# Patient Record
Sex: MALE | Race: WHITE
[De-identification: names, ages, dates, MRNs, and addresses within clinical notes are randomized per-mention and may not be internally consistent; named-entity substitution may affect disease eponyms.]

---

## 2018-08-21 ENCOUNTER — HOSPITAL ENCOUNTER (EMERGENCY)
Dept: HOSPITAL 25 - ED | Age: 73
LOS: 1 days | Discharge: HOME | End: 2018-08-22
Payer: COMMERCIAL

## 2018-08-21 ENCOUNTER — HOSPITAL ENCOUNTER (EMERGENCY)
Dept: HOSPITAL 25 - UCEAST | Age: 73
Discharge: ANOTHER HOSPITAL | End: 2018-08-21
Payer: COMMERCIAL

## 2018-08-21 VITALS — DIASTOLIC BLOOD PRESSURE: 87 MMHG | SYSTOLIC BLOOD PRESSURE: 172 MMHG

## 2018-08-21 DIAGNOSIS — R10.32: Primary | ICD-10-CM

## 2018-08-21 DIAGNOSIS — Z79.01: ICD-10-CM

## 2018-08-21 DIAGNOSIS — R11.0: ICD-10-CM

## 2018-08-21 DIAGNOSIS — Z87.19: ICD-10-CM

## 2018-08-21 DIAGNOSIS — Z85.46: ICD-10-CM

## 2018-08-21 DIAGNOSIS — N20.2: Primary | ICD-10-CM

## 2018-08-21 DIAGNOSIS — Z86.711: ICD-10-CM

## 2018-08-21 DIAGNOSIS — K57.30: ICD-10-CM

## 2018-08-21 LAB
BASOPHILS # BLD AUTO: 0.1 10^3/UL (ref 0–0.2)
EOSINOPHIL # BLD AUTO: 0 10^3/UL (ref 0–0.6)
HCT VFR BLD AUTO: 41 % (ref 42–52)
HGB BLD-MCNC: 14 G/DL (ref 14–18)
LYMPHOCYTES # BLD AUTO: 1.6 10^3/UL (ref 1–4.8)
MCH RBC QN AUTO: 31 PG (ref 27–31)
MCHC RBC AUTO-ENTMCNC: 34 G/DL (ref 31–36)
MCV RBC AUTO: 90 FL (ref 80–94)
MONOCYTES # BLD AUTO: 0.7 10^3/UL (ref 0–0.8)
NEUTROPHILS # BLD AUTO: 15.9 10^3/UL (ref 1.5–7.7)
NRBC # BLD AUTO: 0 10^3/UL
NRBC BLD QL AUTO: 0
PLATELET # BLD AUTO: 273 10^3/UL (ref 150–450)
RBC # BLD AUTO: 4.57 10^6/UL (ref 4–5.4)
WBC # BLD AUTO: 18.3 10^3/UL (ref 3.5–10.8)

## 2018-08-21 PROCEDURE — 74176 CT ABD & PELVIS W/O CONTRAST: CPT

## 2018-08-21 PROCEDURE — 85025 COMPLETE CBC W/AUTO DIFF WBC: CPT

## 2018-08-21 PROCEDURE — 83690 ASSAY OF LIPASE: CPT

## 2018-08-21 PROCEDURE — 80053 COMPREHEN METABOLIC PANEL: CPT

## 2018-08-21 PROCEDURE — 99283 EMERGENCY DEPT VISIT LOW MDM: CPT

## 2018-08-21 PROCEDURE — 96374 THER/PROPH/DIAG INJ IV PUSH: CPT

## 2018-08-21 PROCEDURE — 36415 COLL VENOUS BLD VENIPUNCTURE: CPT

## 2018-08-21 PROCEDURE — G0463 HOSPITAL OUTPT CLINIC VISIT: HCPCS

## 2018-08-21 PROCEDURE — 96360 HYDRATION IV INFUSION INIT: CPT

## 2018-08-21 PROCEDURE — 96375 TX/PRO/DX INJ NEW DRUG ADDON: CPT

## 2018-08-21 PROCEDURE — 99203 OFFICE O/P NEW LOW 30 MIN: CPT

## 2018-08-21 NOTE — ED
Abdominal Pain/Male





- HPI Summary


HPI Summary: 


This patient is a 73 year old M BIBA to [facility] from urgent care with a chief 

complaint of L flank pain radiating to back that began at approximately 2030. 

The patient rates the pain 10/10 in severity. Symptoms aggravated by nothing. 

Symptoms alleviated by nothing. Patient reports nausea.








- History of Current Complaint


Chief Complaint: EDFlankPain


Stated Complaint: ABD PAIN-SENT F/CC


Time Seen by Provider: 08/21/18 22:29


Hx Obtained From: Patient


Onset/Duration: Sudden Onset, Lasting Hours, Still Present


Timing: Constant


Severity Initially: Severe


Severity Currently: Severe


Pain Intensity: 10


Pain Scale Used: 0-10 Numeric


Location: Flank


Radiates: Yes


Radiates to: Back


Character: Sharp


Aggravating Factor(s): Nothing


Alleviating Factor(s): Nothing


Associated Signs And Symptoms: Positive: Nausea





- Allergies/Home Medications


Allergies/Adverse Reactions: 


 Allergies











Allergy/AdvReac Type Severity Reaction Status Date / Time


 


No Known Allergies Allergy   Verified 08/21/18 21:27














PMH/Surg Hx/FS Hx/Imm Hx


Previously Healthy: No


Respiratory History: Reports: Hx Pulmonary Embolism - X2 ON WARFARIN 2005 AND 

2011, Other Respiratory Problems/Disorders - HX PE X2


GI History: Reports: Other GI Disorders - Colon polyps


 History: Reports: Other  Problems/Disorders - HX PROSTATE CANCER


Sensory History: Reports: Hx Cataracts - ROCÍO, Hx Contacts or Glasses - GLASSES 

OR CONTACTS


   Denies: Hx Hearing Aid


Opthamlomology History: Reports: Hx Cataracts - ROCÍO, Hx Contacts or Glasses - 

GLASSES OR CONTACTS





- Cancer History


Cancer Type, Location and Year: Prostate CA





- Surgical History


Surgery Procedure, Year, and Place: PROSATATE, 2008.  [id number], TONSILECYOMY.  

VASECTOMY 1983.  [facility] 2014 ON FACE.  WISDOM TEETH


Hx Anesthesia Reactions: No


Infectious Disease History: No


Infectious Disease History: 


   Denies: Traveled Outside the [place] in Last 30 Days





- Family History


Known Family History: Positive: Renal Disease - Nephrolithiasis 


   Negative: Blood Disorder





- Social History


Occupation: Employed Full-time


Lives: With Family


Alcohol Use: None


Hx Substance Use: No


Substance Use Type: Reports: None


Hx Tobacco Use: No


Smoking Status (MU): Never Smoked Tobacco





Review of Systems


Negative: Fever


Positive: Nausea, Other - Positive L flank pain


All Other Systems Reviewed And Are Negative: Yes





Physical Exam





- Summary


Physical Exam Summary: 


Appearance: Well-appearing, Well-nourished, lying in bed comfortably


Skin: Warm, dry, no obvious rash


Eyes: sclera anicteric, no conjunctival pallor


ENT: mucous membranes moist, pharynx appears normal


Neck: Supple, nontender


Respiratory: Clear to auscultation, no signs of respiratory distress


Cardiovascular: Normal S1, S2. No murmurs. Normal distal pulses in tibial and 

radial bilaterally.


Abdomen: Soft, nontender, normal active bowel sounds present, colicky in 

appearance


Musculoskeletal: Normal, Strength/ROM Intact


Neurological: A&Ox3, awake and alert, mentation is normal, speech is fluent and 

appropriate


Psychiatric: affect is normal, does not appear anxious or depressed





Triage Information Reviewed: Yes


Vital Signs On Initial Exam: 


 Initial Vitals











Temp Pulse Resp BP Pulse Ox


 


 97 F   66   24   168/110   100 


 


 08/21/18 22:25  08/21/18 22:25  08/21/18 22:25  08/21/18 22:25  08/21/18 22:25











Vital Signs Reviewed: Yes





Diagnostics





- Vital Signs


 Vital Signs











  Temp Pulse Resp BP Pulse Ox


 


 08/21/18 22:25  97 F  66  24  168/110  100














- Laboratory


Result Diagrams: 


 08/21/18 22:39





 08/21/18 22:39


Lab Statement: Any lab studies that have been ordered have been reviewed, and 

results considered in the medical decision making process.





- CT


  ** CT Abdomen and Pelvis


CT Interpretation Completed By: Radiologist - CT abdomen and pelvis reveals, 

per radiologist, 1. Mildly obstructing 3 mm calculus distal third left ureter. 

2. Bilateral nonobstructing renal calculi. 3. Diffuse colonic diverticulosis. 

ED physician has reviewed this radiology report.





Re-Evaluation





- Re-Evaluation


  ** First Eval


Change: Improved





Abdominal Pain Fem Course/Dx





- Diagnoses


Provider Diagnoses: 


 Renal colic on left side








Discharge





- Sign-Out/Discharge


Documenting (check all that apply): Patient Departure





- Discharge Plan


Condition: Good


Disposition: HOME


Prescriptions: 


Ondansetron [Zofran Odt] 8 mg PO Q6HR PRN #12 tab.rapdis


 PRN Reason: Nausea


oxyCODONE TAB* [Roxycodone TAB 5 mg*] 5 mg PO Q4H PRN #20 tab MDD 8 tablets


 PRN Reason: Pain


Patient Education Materials:  Kidney Stones (ED)


Referrals: 


Fuad Woods MD [Primary Care Provider] - 





- Billing Disposition and Condition


Condition: GOOD


Disposition: Home





- Attestation Statements


Document Initiated by Elpidio: Yes


Documenting Scribe: Emili Matthews


Provider For Whom Elpidio is Documenting (Include Credential): Jl Vick MD


Scribe Attestation: 


Emili SANDOVAL, scribed for Jl Vick MD on 08/22/18 at 0423. 


Scribe Documentation Reviewed: Yes


Provider Attestation: 


The documentation as recorded by the Emili brody accurately reflects the 

service I personally performed and the decisions made by me, Jl Vick MD

## 2018-08-21 NOTE — ED
Abdominal Pain/Male





- HPI Summary


HPI Summary: 





The pt is a 72 y/o male presenting to  c/o severe LLQ pain since 20:30 today. 

The sudden onset pain is constant. He reports a PMHx of diverculitis. The pt is 

on Coumadin. 





- History of Current Complaint


Stated Complaint: ABD PAIN


Hx Obtained From: Patient, Family/Caretaker


Onset/Duration: Sudden Onset


Severity Initially: Severe


Severity Currently: Severe


Location: Discrete At: LLQ


Character: Sharp





- Allergies/Home Medications


Allergies/Adverse Reactions: 


 Allergies











Allergy/AdvReac Type Severity Reaction Status Date / Time


 


No Known Allergies Allergy   Verified 08/21/18 21:27














PMH/Surg Hx/FS Hx/Imm Hx


Previously Healthy: No


Respiratory History: Reports: Hx Pulmonary Embolism - X2 ON WARFARIN 2005 AND 

2011, Other Respiratory Problems/Disorders - HX PE X2


GI History: Reports: Other GI Disorders - Colon polyps


 History: Reports: Other  Problems/Disorders - HX PROSTATE CANCER


Sensory History: Reports: Hx Cataracts - ROCÍO, Hx Contacts or Glasses - GLASSES 

OR CONTACTS


   Denies: Hx Hearing Aid


Opthamlomology History: Reports: Hx Cataracts - ROCÍO, Hx Contacts or Glasses - 

GLASSES OR CONTACTS





- Cancer History


Cancer Type, Location and Year: Prostate CA





- Surgical History


Surgery Procedure, Year, and Place: PROSATATE, 2008.  [id number], TONSILECYOMY.  

VASECTOMY 1983.  [facility] 2014 ON FACE.  WISDOM TEETH


Hx Anesthesia Reactions: No





- Family History


Known Family History: Positive: Renal Disease - Nephrolithiasis 


   Negative: Blood Disorder





- Social History


Occupation: Employed Full-time


Lives: With Family


Alcohol Use: None


Substance Use Type: Reports: None


Smoking Status (MU): Never Smoked Tobacco





Review of Systems


Constitutional: Negative - Fever


Positive: Abdominal Pain - At the LLQ


All Other Systems Reviewed And Are Negative: Yes





Physical Exam





- Summary


Physical Exam Summary: 





General: well-appearing,moderate to severe pain distress


Skin: warm, appears pale, dry


Head: normal


Eyes: EOMI, DAVID


ENT: normal


Neck: supple, nontender


Respiratory: CTA, breath sounds present


Cardiovascular: RRR


Abdomen: tender to palpation of the LLQ 


Bowel: Hypoactive bowel sounds 


Musculoskeletal: normal, strength/ROM intact


Neurological: sensory/motor intact, A&O x3


Psychological: affect/mood appropriate


Triage Information Reviewed: Yes


Vital Signs On Initial Exam: 





 Initial Vital Signs











Temp  95.8 F   08/21/18 21:22


 


Pulse  58   08/21/18 21:22


 


Resp  24   08/21/18 21:22


 


BP  172/87   08/21/18 21:22


 


Pulse Ox  100   08/21/18 21:22











Vital Signs Reviewed: Yes





Abdominal Pain Fem Course/Dx





- Course


Course Of Treatment: TRANSFERED TO EMERGENCY DEPARTMENT FOR EVALUATION OF LEFT 

SIDED ABDOMINAL PAIN.





- Diagnoses


Provider Diagnoses: 


 Left sided abdominal pain








Discharge





- Sign-Out/Discharge


Documenting (check all that apply): Patient Departure - Transfer 


All imaging exams completed and their final reports reviewed: No Studies





- Discharge Plan


Condition: Stable


Disposition: ADMITTED TO OTHER HOSPITAL


Referrals: 


Fuad Woods MD [Primary Care Provider] - 





- Billing Disposition and Condition


Condition: STABLE


Disposition: Admitted to Other Hospital





- Attestation Statements


Document Initiated by Scribe: Yes


Documenting Scribe: Itzel Barrett 


Provider For Whom Scribe is Documenting (Include Credential): Dr. Julien Rm MD 


Scribe Attestation: 


Itzel SANDOVAL , scribed for Dr. Julien Rm MD  on 08/21/18 at 2148. 


Scribe Documentation Reviewed: Yes


Provider Attestation: 


The documentation as recorded by the scribeItzel  accurately 

reflects the service I personally performed and the decisions made by me, Dr. Julien Rm MD

## 2018-08-21 NOTE — RAD
EXAM:

  CT Abdomen and Pelvis Without Intravenous Contrast



CLINICAL HISTORY:

  73 years old, male; Pain; Abdominal pain; Flank; Left; Additional info: Left 

flank pain, suspect stone



TECHNIQUE:

  Axial computed tomography images of the abdomen and pelvis without 

intravenous contrast.  All CT scans at this facility use at least one of these 

dose optimization techniques: automated exposure control; mA and/or kV 

adjustment per patient size (includes targeted exams where dose is matched to 

clinical indication); or iterative reconstruction.

  Coronal and sagittal reformatted images were created and reviewed.



COMPARISON:

  OT - HIP RT HIP RIGHT 2 VIEWS AND PELVIS 12/19/2016 12:09 PM



FINDINGS:

  Lung bases:  Pleural-parenchymal scarring lateral lower right thorax. Lung 

bases otherwise clear.



 ABDOMEN:

  Liver:  Normal. Normal size. No masses.

  Gallbladder and bile ducts:  Normal.  No radiopaque calculi.  No ductal 

dilation.

  Pancreas:  Normal.  No ductal dilation.

  Spleen:  Normal.  No splenomegaly.

  Adrenals:  Normal.  No mass.

  Kidneys and ureters:  Mild left ureterectasis which terminates at a 0.3 cm 

calculus distal third left ureter (series 601, image 68). Associated moderate 

left perinephric edema. Punctate nonobstructing calculus right renal midpole 

and left inferior pole. No right pelvocaliectasis.

  Stomach and bowel:  Incompletely distended grossly normal stomach. Normal 

caliber small bowel.  Numerous colonic diverticula without adjacent 

inflammatory changes or associated wall thickening.



 PELVIS:

  Appendix:  No dilation or periappendiceal inflammation.

  Bladder:  Thin-walled bladder with no focal nodularity, perivesicular 

stranding, or calcifications.  No stones.

  Reproductive:  Surgically absent prostate.



 ABDOMEN and PELVIS:

  Intraperitoneal space:  Normal.  No pneumoperitoneum.  No ascities.

  Bones/joints:  Chronic compression fracture of the L2 vertebral body.  The 

spine demonstrates mild degenerative changes at multiple levels.  No fractures. 

No suspicious bone lesions.

  Soft tissues:  Normal. No hernias.

  Vasculature:  The aorta demonstrates mild atherosclerotic calcification.  No 

abdominal aortic aneurysm.

  Lymph nodes:  Normal.  No enlarged lymph nodes.



IMPRESSION:     

  1. Mildly obstructing 3 mm calculus distal third left ureter.



  2. Bilateral nonobstructing renal calculi.



  3. Diffuse colonic diverticulosis.

## 2018-08-22 VITALS — DIASTOLIC BLOOD PRESSURE: 100 MMHG | SYSTOLIC BLOOD PRESSURE: 172 MMHG
